# Patient Record
Sex: FEMALE | Race: WHITE | NOT HISPANIC OR LATINO | Employment: OTHER | RURAL
[De-identification: names, ages, dates, MRNs, and addresses within clinical notes are randomized per-mention and may not be internally consistent; named-entity substitution may affect disease eponyms.]

---

## 2018-02-21 VITALS — HEIGHT: 65 IN

## 2018-02-21 RX ORDER — OXYCODONE AND ACETAMINOPHEN 10; 325 MG/1; MG/1
1 TABLET ORAL EVERY 6 HOURS PRN
COMMUNITY

## 2018-02-21 RX ORDER — FENTANYL 100 UG/H
1 PATCH TRANSDERMAL
COMMUNITY

## 2018-02-21 RX ORDER — GABAPENTIN 300 MG/1
300 CAPSULE ORAL 3 TIMES DAILY
COMMUNITY

## 2018-02-21 RX ORDER — TEMAZEPAM 15 MG/1
15 CAPSULE ORAL NIGHTLY PRN
COMMUNITY

## 2018-02-21 RX ORDER — RISEDRONATE SODIUM 30 MG/1
30 TABLET, FILM COATED ORAL
COMMUNITY

## 2018-02-21 RX ORDER — LANOLIN ALCOHOL/MO/W.PET/CERES
325 CREAM (GRAM) TOPICAL
COMMUNITY

## 2018-02-21 RX ORDER — POTASSIUM CHLORIDE 750 MG/1
10 TABLET, FILM COATED, EXTENDED RELEASE ORAL 2 TIMES DAILY
COMMUNITY

## 2018-02-21 RX ORDER — LEVOTHYROXINE SODIUM 0.05 MG/1
50 TABLET ORAL DAILY
COMMUNITY

## 2018-02-21 RX ORDER — SUCRALFATE ORAL 1 G/10ML
1 SUSPENSION ORAL DAILY
COMMUNITY

## 2018-02-21 RX ORDER — LANSOPRAZOLE 30 MG/1
30 CAPSULE, DELAYED RELEASE ORAL EVERY 12 HOURS
COMMUNITY

## 2018-02-21 RX ORDER — CALCIUM CARBONATE 200(500)MG
1 TABLET,CHEWABLE ORAL DAILY
COMMUNITY

## 2018-02-21 RX ORDER — FUROSEMIDE 40 MG/1
40 TABLET ORAL AS NEEDED
COMMUNITY

## 2018-02-21 RX ORDER — CYCLOBENZAPRINE HCL 5 MG
5 TABLET ORAL 3 TIMES DAILY PRN
COMMUNITY
End: 2018-02-26 | Stop reason: ALTCHOICE

## 2018-02-26 ENCOUNTER — OFFICE VISIT (OUTPATIENT)
Dept: CARDIOLOGY | Facility: CLINIC | Age: 83
End: 2018-02-26

## 2018-02-26 VITALS
HEART RATE: 86 BPM | WEIGHT: 98.2 LBS | DIASTOLIC BLOOD PRESSURE: 62 MMHG | HEIGHT: 55 IN | SYSTOLIC BLOOD PRESSURE: 118 MMHG | BODY MASS INDEX: 22.72 KG/M2

## 2018-02-26 DIAGNOSIS — R94.39 ABNORMAL NUCLEAR STRESS TEST: ICD-10-CM

## 2018-02-26 DIAGNOSIS — R55 VASOVAGAL SYNCOPE: Primary | ICD-10-CM

## 2018-02-26 PROCEDURE — 99204 OFFICE O/P NEW MOD 45 MIN: CPT | Performed by: INTERNAL MEDICINE

## 2018-02-26 PROCEDURE — 93000 ELECTROCARDIOGRAM COMPLETE: CPT | Performed by: INTERNAL MEDICINE

## 2018-02-26 NOTE — PROGRESS NOTES
Linnea Perez  12/7/1929  Date of Office Visit: 02/26/2018  Encounter Provider: Yazan Hennessy MD  Place of Service: [facility]      CHIEF COMPLAINT:  Abnormal stress test  Syncope    HISTORY OF PRESENT ILLNESS:Dr. Gay:    I had the pleasure of seeing your patient in consultation today.  As you well know, she is a very pleasant, 88-year-old female who has previously seen Dr. Hewitt secondary to an abnormal stress test.  She, back in late 11/2017, had an episode of just not feeling well.  She was febrile and came into your office for evaluation.  She stated that she received a couple of shots, one of them being a steroid, at that time and afterwards, as she was driving home, felt lightheaded.  She, per her report, felt woozy.  She was walking around the house and took a step out toward the garage and felt that she was walking on air.  That was the last thing that she remembered.  She fell and hit her face and this apparently brought her to.  She then spent multiple hours crawling around the garage without being able to call for help.  She denied any chest pain or tachycardia at that time.  She did have evaluation including a carotid duplex with mild bilateral carotid artery disease, transthoracic echocardiogram with a normal left ventricular systolic function, grade 1 diastolic dysfunction, and mild to moderate tricuspid valve regurgitation.  She had no aortic valve stenosis.  She had a myocardial perfusion stress test with a very small apical defect with mild reversibility and a preserved ejection fraction.    She was in the hospital for a few days and then was subsequently discharged home.    Since that time, she denies any chest pain with or recurrent syncope.  She has mild dyspnea on exertion; however, this has not changed from prior to her episode.        Review of Systems   Constitution: Negative for fever, weakness and malaise/fatigue.   HENT: Negative for  nosebleeds and sore throat.    Eyes: Negative for blurred vision and double vision.   Cardiovascular: Positive for dyspnea on exertion. Negative for chest pain, claudication, palpitations and syncope.   Respiratory: Negative for cough, shortness of breath and snoring.    Endocrine: Negative for cold intolerance, heat intolerance and polydipsia.   Skin: Negative for itching, poor wound healing and rash.   Musculoskeletal: Negative for joint pain, joint swelling, muscle weakness and myalgias.   Gastrointestinal: Negative for abdominal pain, melena, nausea and vomiting.   Neurological: Negative for light-headedness, loss of balance, seizures and vertigo.   Psychiatric/Behavioral: Negative for altered mental status and depression.          Past Medical History:   Diagnosis Date   • Anemia    • Anxiety    • Arthritis    • Disease of thyroid gland    • Hepatic neuropathy    • Hiatal hernia    • Insomnia    • Stenosis of carotid artery     LEFT       The following portions of the patient's history were reviewed and updated as appropriate: Social history , Family history and Surgical history     Current Outpatient Prescriptions on File Prior to Visit   Medication Sig Dispense Refill   • Ascorbic Acid 100 MG chewable tablet Chew Daily.     • calcium carbonate (TUMS) 500 MG chewable tablet Chew 1 tablet Daily.     • Cholecalciferol 400 units chewable tablet Chew 2 (Two) Times a Day.     • fentaNYL (DURAGESIC) 100 MCG/HR patch Place 1 patch on the skin Every 36 (Thirty-Six) Hours.     • ferrous sulfate 325 (65 FE) MG EC tablet Take 325 mg by mouth Daily With Breakfast.     • furosemide (LASIX) 40 MG tablet Take 40 mg by mouth As Needed.     • gabapentin (NEURONTIN) 300 MG capsule Take 300 mg by mouth 3 (Three) Times a Day.     • lansoprazole (PREVACID) 30 MG capsule Take 30 mg by mouth Every 12 (Twelve) Hours.     • levothyroxine (SYNTHROID, LEVOTHROID) 50 MCG tablet Take 50 mcg by mouth Daily.     • Melatonin 10 MG sublingual  "tablet Place  under the tongue.     • Multiple Vitamins-Minerals (CENTRUM SILVER 50+WOMEN PO) Take  by mouth.     • oxyCODONE-acetaminophen (PERCOCET)  MG per tablet Take 1 tablet by mouth Every 6 (Six) Hours As Needed for Moderate Pain .     • potassium chloride (K-DUR) 10 MEQ CR tablet Take 10 mEq by mouth 2 (Two) Times a Day.     • risedronate (ACTONEL) 30 MG tablet Take 30 mg by mouth Every 7 (Seven) Days. with water on empty stomach, nothing by mouth or lie down for next 30 minutes.     • sucralfate (CARAFATE) 1 GM/10ML suspension Take 1 g by mouth Daily.     • temazepam (RESTORIL) 15 MG capsule Take 15 mg by mouth At Night As Needed for Sleep.     • VITAMIN E-1000 PO Take  by mouth.     • [DISCONTINUED] cyclobenzaprine (FLEXERIL) 5 MG tablet Take 5 mg by mouth 3 (Three) Times a Day As Needed for Muscle Spasms.     • [DISCONTINUED] Estrogens, Conjugated (PREMARIN VA) Insert  into the vagina.     • [DISCONTINUED] Iron, Ferrous Gluconate, 256 (28 Fe) MG tablet Take  by mouth Daily.       No current facility-administered medications on file prior to visit.        No Known Allergies    Vitals:    02/26/18 1203   BP: 118/62   Weight: 44.5 kg (98 lb 3.2 oz)   Height: 139.7 cm (55\")     Physical Exam   Constitutional: She is oriented to person, place, and time. She appears well-developed and well-nourished.   HENT:   Head: Normocephalic and atraumatic.   Eyes: Conjunctivae and EOM are normal. No scleral icterus.   Neck: Normal range of motion. Neck supple. Normal carotid pulses, no hepatojugular reflux and no JVD present. Carotid bruit is not present. No tracheal deviation present. No thyromegaly present.   Cardiovascular: Normal rate and regular rhythm.  Exam reveals no gallop and no friction rub.    No murmur heard.  Pulses:       Carotid pulses are 2+ on the right side, and 2+ on the left side.       Radial pulses are 2+ on the right side, and 2+ on the left side.        Femoral pulses are 2+ on the right " side, and 2+ on the left side.       Dorsalis pedis pulses are 2+ on the right side, and 2+ on the left side.        Posterior tibial pulses are 2+ on the right side, and 2+ on the left side.   Pulmonary/Chest: Breath sounds normal. No respiratory distress. She has no decreased breath sounds. She has no wheezes. She has no rhonchi. She has no rales. She exhibits no tenderness.   Abdominal: Soft. Bowel sounds are normal. She exhibits no distension. There is no tenderness. There is no rebound.   Musculoskeletal: She exhibits no edema or deformity.   Neurological: She is alert and oriented to person, place, and time. She has normal strength. No sensory deficit.   Skin: No rash noted. No erythema.   Psychiatric: She has a normal mood and affect. Her behavior is normal.           No components found for: CBC  No results found for: CMP  No components found for: LIPID  No results found for: BMP      ECG 12 Lead  Date/Time: 2/26/2018 12:49 PM  Performed by: ETHAN HERNANDEZ  Authorized by: ETHAN HERNANDEZ   Comparison: compared with previous ECG from 12/26/2017  Similar to previous ECG  Rhythm: sinus rhythm  Rate: normal  Conduction: conduction normal  Conduction: incomplete LBBB  ST Segments: ST segments normal  QRS axis: left  Clinical impression: abnormal ECG             Transthoracic echocardiogram 12/26/17  Left ventricular ejection fraction 60-65%  Grade 1 diastolic dysfunction  Mild to moderate tricuspid valve regurgitation  Mild tricuspid valve regurgitation      Carotid duplex: Mild carotid artery disease bilaterally.    Stress test: Small apical defect with mild reversibility.  Ejection fraction 76%      DISCUSSION/SUMMARYThe patient is a very pleasant, 88-year-old female with a medical history of syncopal episode, prior anemia, and a cardiac workup with a stress test showing a small apical, mildly reversible defect.  She had no preceding angina.  She did have a loss of consciousness and stated that  preceding that she did have wooziness and symptoms after receiving intramuscular injections.  She has not had recurrent syncope.  She has no cardiac symptoms currently.      Syncope.  This sounds to be preceded by some lightheadedness after receiving intramuscular injections.  - She did not have any tachycardia or chest pain and has not had any since that time.  - Her stress test showed a small apical defect that is either thinning or a very small reversible defect.  This is low risk and would not be something that would be significant enough to lead to a syncopal episode.  Syncope from a cardiac etiology would need to be secondary to either profound ischemia and ventricular tachycardia or valvular heart disease, either of which she has.  - No evidence of high-grade AV block on Holter monitor.  - No additional cardiac workup is warranted.  I would not recommend coronary angiography for this low-risk apical defect.  She has no symptoms that are concerning for angina at this time either.    I will see her back as needed.  No additional cardiac workup.